# Patient Record
Sex: FEMALE | Race: WHITE | Employment: OTHER | URBAN - METROPOLITAN AREA
[De-identification: names, ages, dates, MRNs, and addresses within clinical notes are randomized per-mention and may not be internally consistent; named-entity substitution may affect disease eponyms.]

---

## 2022-03-18 PROBLEM — G56.03 CARPAL TUNNEL SYNDROME, BILATERAL: Status: ACTIVE | Noted: 2019-01-22

## 2022-03-18 PROBLEM — M48.02 CERVICAL STENOSIS OF SPINAL CANAL: Status: ACTIVE | Noted: 2019-01-22

## 2022-03-19 PROBLEM — S16.1XXA CERVICAL STRAIN: Status: ACTIVE | Noted: 2019-05-28

## 2022-03-19 PROBLEM — M50.30 DDD (DEGENERATIVE DISC DISEASE), CERVICAL: Status: ACTIVE | Noted: 2019-01-22

## 2024-01-16 ENCOUNTER — HOSPITAL ENCOUNTER (OUTPATIENT)
Dept: GENERAL RADIOLOGY | Age: 59
Discharge: HOME | End: 2024-01-19
Payer: COMMERCIAL

## 2024-01-16 ENCOUNTER — OFFICE VISIT (OUTPATIENT)
Dept: RHEUMATOLOGY | Age: 59
End: 2024-01-16
Payer: COMMERCIAL

## 2024-01-16 VITALS
HEIGHT: 65 IN | BODY MASS INDEX: 21.66 KG/M2 | DIASTOLIC BLOOD PRESSURE: 73 MMHG | HEART RATE: 64 BPM | WEIGHT: 130 LBS | SYSTOLIC BLOOD PRESSURE: 105 MMHG

## 2024-01-16 DIAGNOSIS — M06.4 INFLAMMATORY POLYARTHRITIS (HCC): Primary | ICD-10-CM

## 2024-01-16 DIAGNOSIS — Z71.85 VACCINE COUNSELING: ICD-10-CM

## 2024-01-16 DIAGNOSIS — Z79.899 LONG TERM CURRENT USE OF IMMUNOSUPPRESSIVE DRUG: ICD-10-CM

## 2024-01-16 DIAGNOSIS — R76.8 POSITIVE ANA (ANTINUCLEAR ANTIBODY): ICD-10-CM

## 2024-01-16 DIAGNOSIS — M06.4 INFLAMMATORY POLYARTHRITIS (HCC): ICD-10-CM

## 2024-01-16 DIAGNOSIS — M50.30 DDD (DEGENERATIVE DISC DISEASE), CERVICAL: ICD-10-CM

## 2024-01-16 DIAGNOSIS — H04.129 DRY EYE: ICD-10-CM

## 2024-01-16 DIAGNOSIS — E55.9 VITAMIN D DEFICIENCY: ICD-10-CM

## 2024-01-16 DIAGNOSIS — Z79.899 HIGH RISK MEDICATION USE: ICD-10-CM

## 2024-01-16 DIAGNOSIS — R53.83 OTHER FATIGUE: ICD-10-CM

## 2024-01-16 DIAGNOSIS — R89.4 SEROLOGIC ABNORMALITY: ICD-10-CM

## 2024-01-16 DIAGNOSIS — M19.90 ARTHRITIS: ICD-10-CM

## 2024-01-16 DIAGNOSIS — Z79.52 LONG TERM (CURRENT) USE OF SYSTEMIC STEROIDS: ICD-10-CM

## 2024-01-16 LAB
ERYTHROCYTE [DISTWIDTH] IN BLOOD BY AUTOMATED COUNT: 15.8 % (ref 11.9–14.6)
HCT VFR BLD AUTO: 37.8 % (ref 35.8–46.3)
HGB BLD-MCNC: 11.4 G/DL (ref 11.7–15.4)
MCH RBC QN AUTO: 24.1 PG (ref 26.1–32.9)
MCHC RBC AUTO-ENTMCNC: 30.2 G/DL (ref 31.4–35)
MCV RBC AUTO: 79.7 FL (ref 82–102)
NRBC # BLD: 0 K/UL (ref 0–0.2)
PLATELET # BLD AUTO: 268 K/UL (ref 150–450)
PMV BLD AUTO: 9.7 FL (ref 9.4–12.3)
RBC # BLD AUTO: 4.74 M/UL (ref 4.05–5.2)
WBC # BLD AUTO: 5.2 K/UL (ref 4.3–11.1)

## 2024-01-16 PROCEDURE — 73130 X-RAY EXAM OF HAND: CPT

## 2024-01-16 PROCEDURE — 99245 OFF/OP CONSLTJ NEW/EST HI 55: CPT | Performed by: INTERNAL MEDICINE

## 2024-01-16 RX ORDER — CELECOXIB 200 MG/1
200 CAPSULE ORAL DAILY
COMMUNITY
End: 2024-02-22

## 2024-01-16 RX ORDER — GABAPENTIN 100 MG/1
200 CAPSULE ORAL NIGHTLY
COMMUNITY
Start: 2024-01-11 | End: 2024-02-22

## 2024-01-16 RX ORDER — PREDNISONE 5 MG/1
TABLET ORAL
Qty: 100 TABLET | Refills: 0 | Status: SHIPPED | OUTPATIENT
Start: 2024-01-16

## 2024-01-16 RX ORDER — PILOCARPINE HYDROCHLORIDE 5 MG/1
TABLET, FILM COATED ORAL
Qty: 180 TABLET | Refills: 0 | Status: SHIPPED | OUTPATIENT
Start: 2024-01-16

## 2024-01-16 RX ORDER — GABAPENTIN 300 MG/1
CAPSULE ORAL
Qty: 90 CAPSULE | Refills: 1 | Status: SHIPPED | OUTPATIENT
Start: 2024-01-16 | End: 2025-01-08

## 2024-01-16 NOTE — PROGRESS NOTES
Patient is seen as a new consult at the request of Dr. Busch          Subjective:      HPI:      Permanent history     Chief Complaint:  Domenica Pinto presents with a history of chronic neck pain and stiffness, dry eyes, dry mouth, and joint pain. She reports a recent exacerbation of eye symptoms and has tested positive for antinuclear antibody (JANETT).    History of Present Illness:  Ms. Pinto describes her right eye as feeling scratchy, very dry, and sensitive to light since August 2023, reminiscent of symptoms experienced during a corneal ulcer in 2017. An ophthalmologist observed both old and fresh scarring on the cornea and initiated treatment with steroid eye drops.     Ms. Pinto reports persistent dryness of the mouth, necessitating the use of cough drops for lubrication. She mentions a positive JANETT test result from her recent checkup with Dr. Busch in October.    She has a longstanding history of neck pain and stiffness dating back to 2015, which improves with movement throughout the day. Despite being diagnosed with degenerative disc disease, the pain and stiffness are disproportionate to the expected clinical findings. Interventions including chiropractic care and physical therapy have not provided relief. Morning stiffness also affects her knees, and she experiences lower back pain by the end of the day, particularly after standing or housework. An MRI has revealed kyphosis and a known history of scoliosis.    Ms. Pinto has over the past few years developed severe arthritis leading to a right knee replacement in May 2020.  Despite which she only reports ongoing stiffness and pain in the replaced knee and sudden onset of pain in the left knee since June of the previous year.    She has undergone bilateral carpal tunnel surgery in November 2018 but reports a recurrence of symptoms postoperatively. She has been advised by surgeons to consult a rheumatologist for suspected rheumatoid arthritis.    Ms.

## 2024-01-16 NOTE — PATIENT INSTRUCTIONS
Labs today, CBC, CMP, vitamin D, sed rate, CRP, JANETT, lupus panel, rheumatoid factor, CCP,  Stop Celebrex  Start prednisone 20 mg once daily for 4 days then 15 mg daily for 4 days then 10 mg daily for 4 days then 5 mg daily until follow-up  Gabapentin increased to 300 mg once daily 7 to 8 PM  Salagen 5 mg up to twice daily as needed for dryness  Reading material on rheumatoid arthritis, methotrexate,  X-rays of hands bilaterally patient with elevated JANETT and   inflammatory polyarthritis, please assess for inflammatory changes and or erosive changes  Return to clinic in 2 to 4 weeks call if any issues

## 2024-01-17 LAB
25(OH)D3 SERPL-MCNC: 44.4 NG/ML (ref 30–100)
ALBUMIN SERPL-MCNC: 4 G/DL (ref 3.5–5)
ALBUMIN/GLOB SERPL: 1.1 (ref 0.4–1.6)
ALP SERPL-CCNC: 74 U/L (ref 50–136)
ALT SERPL-CCNC: 45 U/L (ref 12–65)
ANION GAP SERPL CALC-SCNC: 4 MMOL/L (ref 2–11)
AST SERPL-CCNC: 48 U/L (ref 15–37)
BILIRUB SERPL-MCNC: 0.5 MG/DL (ref 0.2–1.1)
BUN SERPL-MCNC: 23 MG/DL (ref 6–23)
CALCIUM SERPL-MCNC: 9.7 MG/DL (ref 8.3–10.4)
CCP IGA+IGG SERPL IA-ACNC: 7 UNITS (ref 0–19)
CENTROMERE B AB SER-ACNC: <0.2 AI (ref 0–0.9)
CHLORIDE SERPL-SCNC: 108 MMOL/L (ref 103–113)
CHROMATIN AB SERPL-ACNC: <0.2 AI (ref 0–0.9)
CO2 SERPL-SCNC: 25 MMOL/L (ref 21–32)
CREAT SERPL-MCNC: 1.1 MG/DL (ref 0.6–1)
CRP SERPL-MCNC: <0.3 MG/DL (ref 0–0.9)
DSDNA AB SER-ACNC: 1 IU/ML (ref 0–9)
ENA JO1 AB SER-ACNC: <0.2 AI (ref 0–0.9)
ENA RNP AB SER-ACNC: <0.2 AI (ref 0–0.9)
ENA SCL70 AB SER-ACNC: <0.2 AI (ref 0–0.9)
ENA SM AB SER-ACNC: <0.2 AI (ref 0–0.9)
ENA SS-A AB SER-ACNC: <0.2 AI (ref 0–0.9)
ENA SS-B AB SER-ACNC: <0.2 AI (ref 0–0.9)
ERYTHROCYTE [SEDIMENTATION RATE] IN BLOOD: 14 MM/HR (ref 0–30)
GLOBULIN SER CALC-MCNC: 3.5 G/DL (ref 2.8–4.5)
GLUCOSE SERPL-MCNC: 75 MG/DL (ref 65–100)
Lab: NORMAL
POTASSIUM SERPL-SCNC: 3.9 MMOL/L (ref 3.5–5.1)
PROT SERPL-MCNC: 7.5 G/DL (ref 6.3–8.2)
RHEUMATOID FACT SER QL LA: NEGATIVE
SODIUM SERPL-SCNC: 137 MMOL/L (ref 136–146)

## 2024-02-22 ENCOUNTER — TELEMEDICINE (OUTPATIENT)
Dept: RHEUMATOLOGY | Age: 59
End: 2024-02-22
Payer: COMMERCIAL

## 2024-02-22 DIAGNOSIS — M15.9 GENERALIZED OSTEOARTHRITIS: ICD-10-CM

## 2024-02-22 DIAGNOSIS — R89.4 SEROLOGIC ABNORMALITY: ICD-10-CM

## 2024-02-22 DIAGNOSIS — R76.8 POSITIVE ANA (ANTINUCLEAR ANTIBODY): ICD-10-CM

## 2024-02-22 DIAGNOSIS — H04.129 DRY EYE: ICD-10-CM

## 2024-02-22 DIAGNOSIS — Z79.899 HIGH RISK MEDICATION USE: ICD-10-CM

## 2024-02-22 DIAGNOSIS — E55.9 HYPOVITAMINOSIS D: ICD-10-CM

## 2024-02-22 DIAGNOSIS — M06.4 INFLAMMATORY POLYARTHRITIS (HCC): Primary | ICD-10-CM

## 2024-02-22 DIAGNOSIS — M50.30 DDD (DEGENERATIVE DISC DISEASE), CERVICAL: ICD-10-CM

## 2024-02-22 DIAGNOSIS — E55.9 VITAMIN D DEFICIENCY: ICD-10-CM

## 2024-02-22 DIAGNOSIS — Z79.52 LONG TERM (CURRENT) USE OF SYSTEMIC STEROIDS: ICD-10-CM

## 2024-02-22 DIAGNOSIS — Z79.899 LONG TERM CURRENT USE OF IMMUNOSUPPRESSIVE DRUG: ICD-10-CM

## 2024-02-22 PROCEDURE — 99215 OFFICE O/P EST HI 40 MIN: CPT | Performed by: INTERNAL MEDICINE

## 2024-02-22 RX ORDER — MULTIVIT-MIN/IRON/FOLIC ACID/K 18-600-40
CAPSULE ORAL
COMMUNITY

## 2024-02-22 NOTE — PROGRESS NOTES
Lisa Pinto is a 59 y.o. female who was seen by synchronous (real-time) audio-video technology.    Consent:  She and/or her healthcare decision maker is aware that this patient-initiated Telehealth encounter is a billable service, with coverage as determined by her insurance carrier. She is aware that she may receive a bill and has provided verbal consent to proceed: Yes    I was at home while conducting this encounter.         Subjective:         HPI:      Permanent history     Chief Complaint:  Domenica Pinto presents with a history of chronic neck pain and stiffness, dry eyes, dry mouth, and joint pain. She reports a recent exacerbation of eye symptoms and has tested positive for antinuclear antibody (JANETT).    History of Present Illness:  Ms. Pinto describes her right eye as feeling scratchy, very dry, and sensitive to light since August 2023, reminiscent of symptoms experienced during a corneal ulcer in 2017. An ophthalmologist observed both old and fresh scarring on the cornea and initiated treatment with steroid eye drops.     Ms. Pinto reports persistent dryness of the mouth, necessitating the use of cough drops for lubrication. She mentions a positive JANETT test result from her recent checkup with Dr. Busch in October.    She has a longstanding history of neck pain and stiffness dating back to 2015, which improves with movement throughout the day. Despite being diagnosed with degenerative disc disease, the pain and stiffness are disproportionate to the expected clinical findings. Interventions including chiropractic care and physical therapy have not provided relief. Morning stiffness also affects her knees, and she experiences lower back pain by the end of the day, particularly after standing or housework. An MRI has revealed kyphosis and a known history of scoliosis.    Ms. Pinto has over the past few years developed severe arthritis leading to a right knee replacement in May 2020.

## 2024-02-22 NOTE — PATIENT INSTRUCTIONS
Labs before next visit CBC and CMP  Start methotrexate 10 mg which is 4 pills altogether once weekly with daily folic acid 1 mg.  Gabapentin 300 mg once daily at 7 PM    prednisone 5 mg daily until follow-up  Return to clinic in 4 to 5 weeks with labs call if any issues    5week,Labs [facility] prior  Sammy- CBC CMP

## 2024-02-23 RX ORDER — FOLIC ACID 1 MG/1
1 TABLET ORAL DAILY
Qty: 90 TABLET | Refills: 0 | Status: SHIPPED | OUTPATIENT
Start: 2024-02-23

## 2024-02-23 RX ORDER — GABAPENTIN 300 MG/1
CAPSULE ORAL
Qty: 90 CAPSULE | Refills: 0 | Status: SHIPPED | OUTPATIENT
Start: 2024-02-23 | End: 2025-02-22

## 2024-02-23 RX ORDER — PREDNISONE 5 MG/1
TABLET ORAL
Qty: 90 TABLET | Refills: 0 | Status: SHIPPED | OUTPATIENT
Start: 2024-02-23

## 2024-02-23 RX ORDER — METHOTREXATE 2.5 MG/1
TABLET ORAL
Qty: 48 TABLET | Refills: 0 | Status: SHIPPED | OUTPATIENT
Start: 2024-02-23

## 2024-03-26 DIAGNOSIS — R76.8 POSITIVE ANA (ANTINUCLEAR ANTIBODY): ICD-10-CM

## 2024-03-26 DIAGNOSIS — R53.83 OTHER FATIGUE: ICD-10-CM

## 2024-03-26 DIAGNOSIS — M15.9 GENERALIZED OSTEOARTHRITIS: ICD-10-CM

## 2024-03-26 DIAGNOSIS — E55.9 HYPOVITAMINOSIS D: ICD-10-CM

## 2024-03-26 DIAGNOSIS — M06.4 INFLAMMATORY POLYARTHRITIS (HCC): Primary | ICD-10-CM

## 2024-03-26 DIAGNOSIS — R89.4 SEROLOGIC ABNORMALITY: ICD-10-CM

## 2024-03-26 DIAGNOSIS — Z79.899 LONG TERM CURRENT USE OF IMMUNOSUPPRESSIVE DRUG: ICD-10-CM

## 2024-03-26 DIAGNOSIS — Z79.899 HIGH RISK MEDICATION USE: ICD-10-CM

## 2024-03-28 DIAGNOSIS — R89.4 SEROLOGIC ABNORMALITY: ICD-10-CM

## 2024-03-28 DIAGNOSIS — R53.83 OTHER FATIGUE: ICD-10-CM

## 2024-03-28 DIAGNOSIS — Z79.899 LONG TERM CURRENT USE OF IMMUNOSUPPRESSIVE DRUG: ICD-10-CM

## 2024-03-28 DIAGNOSIS — D64.9 LOW HEMOGLOBIN: ICD-10-CM

## 2024-03-28 DIAGNOSIS — M06.4 INFLAMMATORY POLYARTHRITIS (HCC): Primary | ICD-10-CM

## 2024-03-28 DIAGNOSIS — Z79.899 HIGH RISK MEDICATION USE: ICD-10-CM

## 2024-03-29 ENCOUNTER — TELEPHONE (OUTPATIENT)
Dept: RHEUMATOLOGY | Age: 59
End: 2024-03-29

## 2024-03-29 DIAGNOSIS — Z79.52 LONG TERM (CURRENT) USE OF SYSTEMIC STEROIDS: ICD-10-CM

## 2024-03-29 DIAGNOSIS — Z79.899 HIGH RISK MEDICATION USE: ICD-10-CM

## 2024-03-29 DIAGNOSIS — M06.4 INFLAMMATORY POLYARTHRITIS (HCC): Primary | ICD-10-CM

## 2024-03-29 DIAGNOSIS — R76.8 POSITIVE ANA (ANTINUCLEAR ANTIBODY): ICD-10-CM

## 2024-03-29 DIAGNOSIS — Z79.899 LONG TERM CURRENT USE OF IMMUNOSUPPRESSIVE DRUG: ICD-10-CM

## 2024-03-29 DIAGNOSIS — R89.9 ABNORMAL LABORATORY TEST: ICD-10-CM

## 2024-03-29 DIAGNOSIS — R89.4 SEROLOGIC ABNORMALITY: ICD-10-CM

## 2024-03-29 DIAGNOSIS — R53.83 OTHER FATIGUE: ICD-10-CM

## 2024-03-29 NOTE — TELEPHONE ENCOUNTER
Awaiting Call back from pt.      3/28/24 Pt called stating her lab for her appt 4/4 showed low hgb, ferritin and trans Sat added,     3/29/24 ferritin low as well, awaiting Trans sat lab, per Dr Christianson place Hem referral, left detail message for pt and to call me back,no iron meds otc at this time

## 2024-04-02 DIAGNOSIS — R89.9 ABNORMAL LABORATORY TEST: ICD-10-CM

## 2024-04-02 DIAGNOSIS — R53.83 OTHER FATIGUE: ICD-10-CM

## 2024-04-02 DIAGNOSIS — R89.4 SEROLOGIC ABNORMALITY: ICD-10-CM

## 2024-04-02 DIAGNOSIS — M06.4 INFLAMMATORY POLYARTHRITIS (HCC): Primary | ICD-10-CM

## 2024-04-02 DIAGNOSIS — E61.1 LOW IRON: ICD-10-CM

## 2024-04-02 DIAGNOSIS — Z79.899 LONG TERM CURRENT USE OF IMMUNOSUPPRESSIVE DRUG: ICD-10-CM

## 2024-04-02 DIAGNOSIS — R79.0 LOW FERRITIN: ICD-10-CM

## 2024-04-02 DIAGNOSIS — Z79.899 HIGH RISK MEDICATION USE: ICD-10-CM

## 2024-04-04 ENCOUNTER — TELEMEDICINE (OUTPATIENT)
Dept: RHEUMATOLOGY | Age: 59
End: 2024-04-04
Payer: COMMERCIAL

## 2024-04-04 DIAGNOSIS — M50.30 DDD (DEGENERATIVE DISC DISEASE), CERVICAL: ICD-10-CM

## 2024-04-04 DIAGNOSIS — Z71.85 VACCINE COUNSELING: ICD-10-CM

## 2024-04-04 DIAGNOSIS — Z79.899 HIGH RISK MEDICATION USE: ICD-10-CM

## 2024-04-04 DIAGNOSIS — H04.129 DRY EYE: ICD-10-CM

## 2024-04-04 DIAGNOSIS — R76.8 POSITIVE ANA (ANTINUCLEAR ANTIBODY): ICD-10-CM

## 2024-04-04 DIAGNOSIS — R89.4 SEROLOGIC ABNORMALITY: ICD-10-CM

## 2024-04-04 DIAGNOSIS — Z79.899 LONG TERM CURRENT USE OF IMMUNOSUPPRESSIVE DRUG: ICD-10-CM

## 2024-04-04 DIAGNOSIS — E55.9 VITAMIN D DEFICIENCY: ICD-10-CM

## 2024-04-04 DIAGNOSIS — M06.4 INFLAMMATORY POLYARTHRITIS (HCC): Primary | ICD-10-CM

## 2024-04-04 DIAGNOSIS — M15.9 GENERALIZED OSTEOARTHRITIS: ICD-10-CM

## 2024-04-04 DIAGNOSIS — D50.8 OTHER IRON DEFICIENCY ANEMIA: ICD-10-CM

## 2024-04-04 DIAGNOSIS — E55.9 HYPOVITAMINOSIS D: ICD-10-CM

## 2024-04-04 DIAGNOSIS — Z79.52 LONG TERM (CURRENT) USE OF SYSTEMIC STEROIDS: ICD-10-CM

## 2024-04-04 PROCEDURE — 99215 OFFICE O/P EST HI 40 MIN: CPT | Performed by: INTERNAL MEDICINE

## 2024-04-04 RX ORDER — PREDNISONE 5 MG/1
TABLET ORAL
Qty: 90 TABLET | Refills: 0 | Status: SHIPPED | OUTPATIENT
Start: 2024-04-04

## 2024-04-04 RX ORDER — METHOTREXATE 2.5 MG/1
TABLET ORAL
Qty: 72 TABLET | Refills: 0 | Status: SHIPPED | OUTPATIENT
Start: 2024-04-04

## 2024-04-04 RX ORDER — GABAPENTIN 300 MG/1
CAPSULE ORAL
Qty: 90 CAPSULE | Refills: 0 | Status: SHIPPED | OUTPATIENT
Start: 2024-04-04 | End: 2025-03-28

## 2024-04-04 RX ORDER — FOLIC ACID 1 MG/1
1 TABLET ORAL DAILY
Qty: 90 TABLET | Refills: 0 | Status: SHIPPED | OUTPATIENT
Start: 2024-04-04

## 2024-04-04 NOTE — PATIENT INSTRUCTIONS
Labs before next visit CBC and CMP  Continue methotrexate but increase to 15 mg which is 6 pills altogether once weekly with daily folic acid 1 mg.  If any nausea with taking all 6 pills together can split the dose to 3 pills in the morning on Mondays and 3 pills in the evening on Mondays.  Continue gabapentin 300 mg once daily at 7 PM  Can take prednisone 5 mg daily at needed  Follow-up with hematology next week  Return to clinic in 5-6 weeks with labs call if any issues, in person

## 2024-04-04 NOTE — PROGRESS NOTES
conducted, with patient's consent, to reduce the patient's risk of exposure to COVID-19 and provide continuity of care for an established patient.     Services were provided through a video synchronous discussion virtually to substitute for in-person clinic visit.    Belén Christianson MD

## 2024-05-15 ENCOUNTER — OFFICE VISIT (OUTPATIENT)
Dept: RHEUMATOLOGY | Age: 59
End: 2024-05-15
Payer: COMMERCIAL

## 2024-05-15 VITALS
HEIGHT: 65 IN | SYSTOLIC BLOOD PRESSURE: 107 MMHG | WEIGHT: 128 LBS | BODY MASS INDEX: 21.33 KG/M2 | DIASTOLIC BLOOD PRESSURE: 73 MMHG | HEART RATE: 75 BPM

## 2024-05-15 DIAGNOSIS — M06.4 INFLAMMATORY POLYARTHRITIS (HCC): Primary | ICD-10-CM

## 2024-05-15 DIAGNOSIS — E55.9 VITAMIN D DEFICIENCY: ICD-10-CM

## 2024-05-15 DIAGNOSIS — R53.83 OTHER FATIGUE: ICD-10-CM

## 2024-05-15 DIAGNOSIS — M50.30 DDD (DEGENERATIVE DISC DISEASE), CERVICAL: ICD-10-CM

## 2024-05-15 DIAGNOSIS — Z79.899 LONG TERM CURRENT USE OF IMMUNOSUPPRESSIVE DRUG: ICD-10-CM

## 2024-05-15 DIAGNOSIS — H04.129 DRY EYE: ICD-10-CM

## 2024-05-15 DIAGNOSIS — R76.8 POSITIVE ANA (ANTINUCLEAR ANTIBODY): ICD-10-CM

## 2024-05-15 DIAGNOSIS — R89.4 SEROLOGIC ABNORMALITY: ICD-10-CM

## 2024-05-15 DIAGNOSIS — Z79.899 HIGH RISK MEDICATION USE: ICD-10-CM

## 2024-05-15 DIAGNOSIS — Z79.52 LONG TERM (CURRENT) USE OF SYSTEMIC STEROIDS: ICD-10-CM

## 2024-05-15 DIAGNOSIS — E55.9 HYPOVITAMINOSIS D: ICD-10-CM

## 2024-05-15 DIAGNOSIS — M15.9 GENERALIZED OSTEOARTHRITIS: ICD-10-CM

## 2024-05-15 DIAGNOSIS — D50.8 OTHER IRON DEFICIENCY ANEMIA: ICD-10-CM

## 2024-05-15 PROCEDURE — 99215 OFFICE O/P EST HI 40 MIN: CPT | Performed by: INTERNAL MEDICINE

## 2024-05-15 RX ORDER — GABAPENTIN 300 MG/1
CAPSULE ORAL
Qty: 180 CAPSULE | Refills: 1 | Status: SHIPPED | OUTPATIENT
Start: 2024-05-15 | End: 2025-05-15

## 2024-05-15 RX ORDER — PREDNISONE 5 MG/1
TABLET ORAL
Qty: 100 TABLET | Refills: 0 | Status: SHIPPED | OUTPATIENT
Start: 2024-05-15

## 2024-05-15 RX ORDER — METHOTREXATE 2.5 MG/1
TABLET ORAL
Qty: 72 TABLET | Refills: 0 | Status: CANCELLED | OUTPATIENT
Start: 2024-05-15

## 2024-05-15 RX ORDER — FOLIC ACID 1 MG/1
1 TABLET ORAL DAILY
Qty: 90 TABLET | Refills: 1 | Status: SHIPPED | OUTPATIENT
Start: 2024-05-15

## 2024-05-15 RX ORDER — ACETAMINOPHEN 500 MG
500 TABLET ORAL DAILY PRN
COMMUNITY

## 2024-05-15 NOTE — PROGRESS NOTES
Subjective:         HPI:      Permanent history     Chief Complaint:  Domenica Pinto is a 59-year-old lady with  a history of chronic neck pain and stiffness, dry eyes, dry mouth, and joint pain. She reports a recent exacerbation of eye symptoms and has tested positive for antinuclear antibody (JANETT).    History of Present Illness:  Ms. Pinto describes her right eye as feeling scratchy, very dry, and sensitive to light since August 2023, reminiscent of symptoms experienced during a corneal ulcer in 2017. An ophthalmologist observed both old and fresh scarring on the cornea and initiated treatment with steroid eye drops.     Ms. Pinto reports persistent dryness of the mouth, necessitating the use of cough drops for lubrication. She mentions a positive JANETT test result from her recent checkup with Dr. Busch in October.    She has a longstanding history of neck pain and stiffness dating back to 2015, which improves with movement throughout the day. Despite being diagnosed with degenerative disc disease, the pain and stiffness are disproportionate to the expected clinical findings. Interventions including chiropractic care and physical therapy have not provided relief. Morning stiffness also affects her knees, and she experiences lower back pain by the end of the day, particularly after standing or housework. An MRI has revealed kyphosis and a known history of scoliosis.    Ms. Pinto has over the past few years developed severe arthritis leading to a right knee replacement in May 2020.  Despite which she only reports ongoing stiffness and pain in the replaced knee and sudden onset of pain in the left knee since June of the previous year.    She has undergone bilateral carpal tunnel surgery in November 2018 but reports a recurrence of symptoms postoperatively. She has been advised by surgeons to consult a rheumatologist for suspected rheumatoid arthritis.    Ms. Pinto has actually been seen by me in the

## 2024-05-15 NOTE — PATIENT INSTRUCTIONS
Labs before next visit CBC and CMP  Continue methotrexate  15 mg which is 6 pills altogether once weekly with daily folic acid 1 mg.    Continue gabapentin 300 -600 mg once daily at 7 PM  Can take prednisone 5 mg daily at needed   Return to clinic in 2-3 months with labs call if any issues     3MO,Labs [facility] w pt

## 2024-07-26 DIAGNOSIS — H04.129 DRY EYE: ICD-10-CM

## 2024-07-26 DIAGNOSIS — Z79.899 HIGH RISK MEDICATION USE: ICD-10-CM

## 2024-07-26 DIAGNOSIS — Z79.899 LONG TERM CURRENT USE OF IMMUNOSUPPRESSIVE DRUG: ICD-10-CM

## 2024-07-26 DIAGNOSIS — M15.9 GENERALIZED OSTEOARTHRITIS: ICD-10-CM

## 2024-07-26 DIAGNOSIS — E55.9 VITAMIN D DEFICIENCY: ICD-10-CM

## 2024-07-26 DIAGNOSIS — Z71.85 VACCINE COUNSELING: ICD-10-CM

## 2024-07-26 DIAGNOSIS — R76.8 POSITIVE ANA (ANTINUCLEAR ANTIBODY): ICD-10-CM

## 2024-07-26 DIAGNOSIS — Z79.52 LONG TERM (CURRENT) USE OF SYSTEMIC STEROIDS: ICD-10-CM

## 2024-07-26 DIAGNOSIS — D50.8 OTHER IRON DEFICIENCY ANEMIA: ICD-10-CM

## 2024-07-26 DIAGNOSIS — R89.4 SEROLOGIC ABNORMALITY: ICD-10-CM

## 2024-07-26 DIAGNOSIS — M50.30 DDD (DEGENERATIVE DISC DISEASE), CERVICAL: ICD-10-CM

## 2024-07-26 DIAGNOSIS — M06.4 INFLAMMATORY POLYARTHRITIS (HCC): ICD-10-CM

## 2024-07-26 DIAGNOSIS — E55.9 HYPOVITAMINOSIS D: ICD-10-CM

## 2024-07-26 NOTE — TELEPHONE ENCOUNTER
OV 5/15/24, Appt 8/21, Labs 5/8/24 in epic/media. Dx Inflamm Arth, Pt is out of Mtx, Refill pended

## 2024-07-29 RX ORDER — METHOTREXATE 2.5 MG/1
TABLET ORAL
Qty: 72 TABLET | Refills: 0 | Status: SHIPPED | OUTPATIENT
Start: 2024-07-29

## 2024-08-21 ENCOUNTER — OFFICE VISIT (OUTPATIENT)
Dept: RHEUMATOLOGY | Age: 59
End: 2024-08-21
Payer: COMMERCIAL

## 2024-08-21 VITALS
BODY MASS INDEX: 21.16 KG/M2 | SYSTOLIC BLOOD PRESSURE: 102 MMHG | DIASTOLIC BLOOD PRESSURE: 64 MMHG | HEIGHT: 65 IN | HEART RATE: 62 BPM | WEIGHT: 127 LBS

## 2024-08-21 DIAGNOSIS — M50.30 DDD (DEGENERATIVE DISC DISEASE), CERVICAL: ICD-10-CM

## 2024-08-21 DIAGNOSIS — M06.4 INFLAMMATORY POLYARTHRITIS (HCC): Primary | ICD-10-CM

## 2024-08-21 DIAGNOSIS — Z79.899 LONG TERM CURRENT USE OF IMMUNOSUPPRESSIVE DRUG: ICD-10-CM

## 2024-08-21 DIAGNOSIS — Z79.899 HIGH RISK MEDICATION USE: ICD-10-CM

## 2024-08-21 DIAGNOSIS — H04.129 DRY EYE: ICD-10-CM

## 2024-08-21 DIAGNOSIS — D50.8 OTHER IRON DEFICIENCY ANEMIA: ICD-10-CM

## 2024-08-21 DIAGNOSIS — R89.4 SEROLOGIC ABNORMALITY: ICD-10-CM

## 2024-08-21 DIAGNOSIS — Z79.52 LONG TERM (CURRENT) USE OF SYSTEMIC STEROIDS: ICD-10-CM

## 2024-08-21 DIAGNOSIS — R53.83 OTHER FATIGUE: ICD-10-CM

## 2024-08-21 DIAGNOSIS — E55.9 VITAMIN D DEFICIENCY: ICD-10-CM

## 2024-08-21 DIAGNOSIS — M15.9 GENERALIZED OSTEOARTHRITIS: ICD-10-CM

## 2024-08-21 DIAGNOSIS — R76.8 POSITIVE ANA (ANTINUCLEAR ANTIBODY): ICD-10-CM

## 2024-08-21 DIAGNOSIS — E55.9 HYPOVITAMINOSIS D: ICD-10-CM

## 2024-08-21 DIAGNOSIS — Z71.85 VACCINE COUNSELING: ICD-10-CM

## 2024-08-21 DIAGNOSIS — Z23 NEED FOR INFLUENZA VACCINATION: ICD-10-CM

## 2024-08-21 PROCEDURE — 99215 OFFICE O/P EST HI 40 MIN: CPT | Performed by: INTERNAL MEDICINE

## 2024-08-21 PROCEDURE — 90471 IMMUNIZATION ADMIN: CPT | Performed by: INTERNAL MEDICINE

## 2024-08-21 PROCEDURE — 90653 IIV ADJUVANT VACCINE IM: CPT | Performed by: INTERNAL MEDICINE

## 2024-08-21 NOTE — PATIENT INSTRUCTIONS
Labs before next visit CBC, vitamin D and CMP  Continue methotrexate  15 mg which is 6 pills altogether once weekly with daily folic acid 1 mg.    Continue gabapentin 300 -600 mg once daily at 7 PM  Can take prednisone 5 mg daily at needed  High-dose flu shot due today, remember to hold the dose of methotrexate that follows today's dose  Vitamin D 2000 units daily  Return to clinic in 3 months with labs call if any issues     3MO,Labs [facility] prior w pt

## 2024-08-23 RX ORDER — FOLIC ACID 1 MG/1
1 TABLET ORAL DAILY
Qty: 90 TABLET | Refills: 0 | Status: SHIPPED | OUTPATIENT
Start: 2024-08-23

## 2024-08-23 RX ORDER — METHOTREXATE 2.5 MG/1
TABLET ORAL
Qty: 72 TABLET | Refills: 0 | Status: SHIPPED | OUTPATIENT
Start: 2024-08-23

## 2024-08-23 RX ORDER — PREDNISONE 5 MG/1
TABLET ORAL
Qty: 100 TABLET | Refills: 0 | Status: SHIPPED | OUTPATIENT
Start: 2024-08-23

## 2024-08-23 RX ORDER — GABAPENTIN 300 MG/1
CAPSULE ORAL
Qty: 180 CAPSULE | Refills: 0 | Status: SHIPPED | OUTPATIENT
Start: 2024-08-23 | End: 2025-08-21

## 2024-11-21 ENCOUNTER — TELEPHONE (OUTPATIENT)
Dept: RHEUMATOLOGY | Age: 59
End: 2024-11-21

## 2024-11-21 NOTE — TELEPHONE ENCOUNTER
Last OV 8/12, appt today was rescheduled, see pt message below, Included labs and last Plan, thanks    Dr. Christianson,   I was going to ask about whether I could reduce my dosage of Methotrexate back to 4 tablets weekly since my ALT is elevated in the bloodwork I had done last Thursday, 11/14, in preparation for the appointment I was supposed to have with you today.  Since I now won't see you until Dec 31st for the virtual visit which is replacing today's appointment, I wanted to ask if I could go ahead and make this change.  I take my weekly dose on Monday mornings.  This was something my  Ez was going to discuss with you today.  Thanks!Domenica      Plan:      Labs before next visit CBC, vitamin D and CMP  Continue methotrexate  15 mg which is 6 pills altogether once weekly with daily folic acid 1 mg.    Continue gabapentin 300 -600 mg once daily at 7 PM  Can take prednisone 5 mg daily at needed  High-dose flu shot due today, remember to hold the dose of methotrexate that follows today's dose  Vitamin D 2000 units daily  Return to clinic in 3 months with labs call if any issues           Contains abnormal data Comprehensive metabolic panel  Order: [id number]   suggestion  Information displayed in this report may not trend or trigger automated decision support.     Component  Ref Range & Units 7 d ago   Glucose  70 - 99 mg/dL 91   BUN  6.0 - 20.0 mg/dL 20.6 High    Creatinine  0.70 - 1.20 mg/dL 0.83   BUN/Creat Ratio 25   Sodium  135 - 146 mmol/L 140   K (Potassium)  3.5 - 5.2 mmol/L 4.3   Chloride  98 - 107 mmol/L 103   CO2  22 - 29 mmol/L 27   Anion Gap  4 - 15 mmol/L 11   Calcium  8.6 - 10.0 mg/dL 9.6   Total Protein  6.4 - 8.3 g/dL 6.6   Albumin  3.9 - 4.9 g/dL 4.4   Globulin 2.2   Albumin/Globulin Ratio 2   Bili T  0.2 - 1.2 mg/dL 0.8   Alkaline Phosphatase  35 - 104 U/L 70   AST  5 - 32 U/L 35 High    ALT  5 - 33 U/L 43 High    eGFR  >=60 mL/min/1.73m*2 >60   Comment: eGFR calculation based on the Chronic

## 2025-01-02 ENCOUNTER — TELEPHONE (OUTPATIENT)
Dept: RHEUMATOLOGY | Age: 60
End: 2025-01-02

## 2025-01-02 DIAGNOSIS — R89.4 SEROLOGIC ABNORMALITY: ICD-10-CM

## 2025-01-02 DIAGNOSIS — Z79.52 LONG TERM (CURRENT) USE OF SYSTEMIC STEROIDS: ICD-10-CM

## 2025-01-02 DIAGNOSIS — D50.8 OTHER IRON DEFICIENCY ANEMIA: ICD-10-CM

## 2025-01-02 DIAGNOSIS — Z79.899 LONG TERM CURRENT USE OF IMMUNOSUPPRESSIVE DRUG: ICD-10-CM

## 2025-01-02 DIAGNOSIS — M50.30 DDD (DEGENERATIVE DISC DISEASE), CERVICAL: ICD-10-CM

## 2025-01-02 DIAGNOSIS — R76.8 POSITIVE ANA (ANTINUCLEAR ANTIBODY): ICD-10-CM

## 2025-01-02 DIAGNOSIS — M06.4 INFLAMMATORY POLYARTHRITIS (HCC): ICD-10-CM

## 2025-01-02 DIAGNOSIS — H04.129 DRY EYE: ICD-10-CM

## 2025-01-02 DIAGNOSIS — M15.9 GENERALIZED OSTEOARTHRITIS: ICD-10-CM

## 2025-01-02 DIAGNOSIS — Z79.899 HIGH RISK MEDICATION USE: ICD-10-CM

## 2025-01-02 DIAGNOSIS — E55.9 VITAMIN D DEFICIENCY: ICD-10-CM

## 2025-01-02 DIAGNOSIS — E55.9 HYPOVITAMINOSIS D: ICD-10-CM

## 2025-01-02 RX ORDER — FOLIC ACID 1 MG/1
1 TABLET ORAL DAILY
Qty: 90 TABLET | Refills: 0 | Status: SHIPPED | OUTPATIENT
Start: 2025-01-02

## 2025-01-02 NOTE — TELEPHONE ENCOUNTER
Sammy pt, OV 8/21/24, Labs 12/27/24, Refill pended for Fa 1mg, thanks      CBC (With Auto Differential) Reflex to Manual Diff if Indicated  Order: [id number]        Component  Ref Range & Units 6 d ago   WBC  3.50 - 10.80 K/microL 5.75   RBC  3.60 - 5.00 M/microL 4.53   HGB  11.5 - 15.3 g/dL 13.8   HCT  33.5 - 46.0 % 42.1   MCV  80.0 - 100.0 fL 92.9   MCH  25.9 - 34.9 PG 30.5   MCHC  32.0 - 36.0 g/dL 32.8   RDW  11.0 - 15.0 % 13.5   PLT  150 - 450 K/microL 191   MPV  9.1 - 13.0 fL 9.2   Neutrophil %  % 57.4   Imm Granulocytes %  % 0   Lymphocyte %  % 28.2   Monocyte %  % 9.4   Eosinophil %  % 4.5   Basophil %  % 0.5   Comment: Per College [employer] (CAP), reference ranges should not be reported for the percent cell counts when reporting reference ranges for the absolute number counts to prevent misinterpretation of CBC data.   NRBC %  0.0 - 0.9 % 0   Neutrophil #  1.50 - 7.50 K/microL 3.3   Imm Granulocytes #  0.00 - 0.05 K/microL <0.03   Lymphocyte #  0.70 - 3.80 K/microL 1.62   Monocyte #  0.20 - 1.30 K/microL 0.54   Eosinophil #  0.00 - 0.70 K/microL 0.26   Basophil #  0.00 - 0.20 K/microL 0.03   NRBC #  0.00 - 0.09 K/microL <0.01     Specimen Collected: 12/27/24 16:45    Performed by: [facility]        Comprehensive metabolic panel  Order: [id number]   suggestion  Information displayed in this report may not trend or trigger automated decision support.     Component  Ref Range & Units 6 d ago   Glucose  70 - 99 mg/dL 86   BUN  6.0 - 20.0 mg/dL 20.5 High    Creatinine  0.70 - 1.20 mg/dL 1   BUN/Creat Ratio 21   Sodium  135 - 146 mmol/L 139   K (Potassium)  3.5 - 5.2 mmol/L 4.2   Chloride  98 - 107 mmol/L 103   CO2  22 - 29 mmol/L 25   Anion Gap  4 - 15 mmol/L 11   Calcium  8.6 - 10.0 mg/dL 9.1   Total Protein  6.4 - 8.3 g/dL 6.5   Albumin  3.9 - 4.9 g/dL 4.5   Globulin 2   Albumin/Globulin Ratio 2.3   Bili T  0.2 - 1.2 mg/dL 0.3   Alkaline Phosphatase  35 - 104 U/L 82   AST  5 - 32 U/L 25   ALT  5 - 33

## 2025-01-02 NOTE — TELEPHONE ENCOUNTER
Last seen 8/21/24, Mtx was reduced from 6 pills to 4 on 11/22 due to elevated lfts, see pt message below, thanks      Dr. Christianson,  I would like to discontinue taking Methotrexate due to some serious side effects I've been experiencing over the past 3-4 months.  I wanted to discuss these issues with you back on November 20 during my office appointment with you and when that was cancelled by your office, I was very eager to speak to you on December 31, the rescheduled time I was given.  I am frustrated that this appointment was also cancelled and rescheduled for Jan 30, which I find unacceptable. I called to request an earlier time and was given a virtual appt on 1/9/25. However, in the meantime, I need you to advise how I can safely wean off Methotrexate because I don't feel comfortable continuing on this medication.  I need an answer before 1/6/25.   The two main symptoms that concern me most are hair loss (to the extent that my  and my  both have noticed-- I’m shedding hair at a greater rate when I shampoo, comb or brush my hair plus I notice more hair falling out throughout the day) and memory loss/brain fog. I’m not talking about misplacing items like keys or forgetting where I parked the car. I could give 6 examples of egregious lapse of memory which have been occurring since September.

## 2025-01-06 NOTE — TELEPHONE ENCOUNTER
Replied thru My Chart  Hi, per Dr Christianson, you can stop the Methotrexate,you dont have to wean off, you can continue the folic acid that you have if you like, it can help some with the hair loss, thank you

## 2025-01-09 ENCOUNTER — TELEMEDICINE (OUTPATIENT)
Dept: RHEUMATOLOGY | Age: 60
End: 2025-01-09
Payer: COMMERCIAL

## 2025-01-09 DIAGNOSIS — D50.8 OTHER IRON DEFICIENCY ANEMIA: ICD-10-CM

## 2025-01-09 DIAGNOSIS — M54.16 RADICULOPATHY, LUMBAR REGION: ICD-10-CM

## 2025-01-09 DIAGNOSIS — H04.129 DRY EYE: ICD-10-CM

## 2025-01-09 DIAGNOSIS — M50.30 DDD (DEGENERATIVE DISC DISEASE), CERVICAL: ICD-10-CM

## 2025-01-09 DIAGNOSIS — R76.8 POSITIVE ANA (ANTINUCLEAR ANTIBODY): ICD-10-CM

## 2025-01-09 DIAGNOSIS — Z79.899 LONG TERM CURRENT USE OF IMMUNOSUPPRESSIVE DRUG: ICD-10-CM

## 2025-01-09 DIAGNOSIS — M15.9 GENERALIZED OSTEOARTHRITIS: ICD-10-CM

## 2025-01-09 DIAGNOSIS — E55.9 VITAMIN D DEFICIENCY: ICD-10-CM

## 2025-01-09 DIAGNOSIS — Z79.52 LONG TERM (CURRENT) USE OF SYSTEMIC STEROIDS: ICD-10-CM

## 2025-01-09 DIAGNOSIS — Z79.899 HIGH RISK MEDICATION USE: ICD-10-CM

## 2025-01-09 DIAGNOSIS — R53.83 OTHER FATIGUE: ICD-10-CM

## 2025-01-09 DIAGNOSIS — R89.4 SEROLOGIC ABNORMALITY: ICD-10-CM

## 2025-01-09 DIAGNOSIS — M06.4 INFLAMMATORY POLYARTHRITIS (HCC): Primary | ICD-10-CM

## 2025-01-09 PROCEDURE — 99215 OFFICE O/P EST HI 40 MIN: CPT | Performed by: INTERNAL MEDICINE

## 2025-01-09 RX ORDER — GABAPENTIN 300 MG/1
CAPSULE ORAL
Qty: 180 CAPSULE | Refills: 0 | Status: SHIPPED | OUTPATIENT
Start: 2025-01-09 | End: 2026-01-07

## 2025-01-09 RX ORDER — FOLIC ACID 1 MG/1
1 TABLET ORAL DAILY
Qty: 90 TABLET | Refills: 0 | Status: SHIPPED | OUTPATIENT
Start: 2025-01-09

## 2025-01-09 RX ORDER — PREDNISONE 5 MG/1
TABLET ORAL
Qty: 100 TABLET | Refills: 0 | Status: SHIPPED | OUTPATIENT
Start: 2025-01-09

## 2025-01-09 NOTE — PATIENT INSTRUCTIONS
Start gabapentin back up 300 mg daily for 3 days then up to 600 mg daily at 7 PM    Stay off methotrexate for now but can continue folic acid  Can take prednisone 5 mg daily at needed   Vitamin D 2000 units daily  Labs before next visit TPMT level  Reading material on Imuran  Return to clinic in 6 to 8 weeks with labs call if any issues     8WK,Labs [facility] prior mail/fax  Lab needs to be done 3 weeks prior to appt or now-takes time to result

## 2025-09-01 DIAGNOSIS — M54.16 RADICULOPATHY, LUMBAR REGION: ICD-10-CM

## 2025-09-01 DIAGNOSIS — H04.129 DRY EYE: ICD-10-CM

## 2025-09-01 DIAGNOSIS — R76.8 POSITIVE ANA (ANTINUCLEAR ANTIBODY): ICD-10-CM

## 2025-09-01 DIAGNOSIS — M06.4 INFLAMMATORY POLYARTHRITIS (HCC): ICD-10-CM

## 2025-09-01 DIAGNOSIS — D50.8 OTHER IRON DEFICIENCY ANEMIA: ICD-10-CM

## 2025-09-01 DIAGNOSIS — M15.9 GENERALIZED OSTEOARTHRITIS: ICD-10-CM

## 2025-09-01 DIAGNOSIS — M50.30 DDD (DEGENERATIVE DISC DISEASE), CERVICAL: ICD-10-CM

## 2025-09-01 DIAGNOSIS — Z79.60 LONG TERM CURRENT USE OF IMMUNOSUPPRESSIVE DRUG: ICD-10-CM

## 2025-09-01 DIAGNOSIS — Z79.52 LONG TERM (CURRENT) USE OF SYSTEMIC STEROIDS: ICD-10-CM

## 2025-09-01 DIAGNOSIS — R53.83 OTHER FATIGUE: ICD-10-CM

## 2025-09-01 DIAGNOSIS — E55.9 VITAMIN D DEFICIENCY: ICD-10-CM

## 2025-09-01 DIAGNOSIS — R89.4 SEROLOGIC ABNORMALITY: ICD-10-CM

## 2025-09-01 DIAGNOSIS — Z79.899 HIGH RISK MEDICATION USE: ICD-10-CM

## 2025-09-03 RX ORDER — GABAPENTIN 300 MG/1
CAPSULE ORAL
Qty: 180 CAPSULE | Refills: 0 | OUTPATIENT
Start: 2025-09-03